# Patient Record
Sex: MALE | Race: WHITE
[De-identification: names, ages, dates, MRNs, and addresses within clinical notes are randomized per-mention and may not be internally consistent; named-entity substitution may affect disease eponyms.]

---

## 2019-09-24 ENCOUNTER — HOSPITAL ENCOUNTER (EMERGENCY)
Dept: HOSPITAL 95 - ER | Age: 59
Discharge: ANOTHER HOSPITAL | End: 2019-09-24
Payer: COMMERCIAL

## 2019-09-24 VITALS — WEIGHT: 167 LBS | BODY MASS INDEX: 25.31 KG/M2 | HEIGHT: 68 IN

## 2019-09-24 DIAGNOSIS — S00.12XA: ICD-10-CM

## 2019-09-24 DIAGNOSIS — S36.039A: ICD-10-CM
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
## 2019-09-24 DIAGNOSIS — S37.031A: ICD-10-CM

## 2019-09-24 DIAGNOSIS — S02.40FA: ICD-10-CM

## 2019-09-24 DIAGNOSIS — S02.82XA: Primary | ICD-10-CM

## 2019-09-24 DIAGNOSIS — Z79.899: ICD-10-CM

## 2019-09-24 LAB
ALBUMIN SERPL BCP-MCNC: 3.4 G/DL (ref 3.4–5)
ALBUMIN/GLOB SERPL: 1.2 {RATIO} (ref 0.8–1.8)
ALT SERPL W P-5'-P-CCNC: 33 U/L (ref 12–78)
ANION GAP SERPL CALCULATED.4IONS-SCNC: 5 MMOL/L (ref 6–16)
AST SERPL W P-5'-P-CCNC: 40 U/L (ref 12–37)
BASOPHILS # BLD AUTO: 0.05 K/MM3 (ref 0–0.23)
BASOPHILS NFR BLD AUTO: 0 % (ref 0–2)
BILIRUB SERPL-MCNC: 0.9 MG/DL (ref 0.1–1)
BUN SERPL-MCNC: 13 MG/DL (ref 8–24)
CALCIUM SERPL-MCNC: 8.7 MG/DL (ref 8.5–10.1)
CHLORIDE SERPL-SCNC: 109 MMOL/L (ref 98–108)
CO2 SERPL-SCNC: 30 MMOL/L (ref 21–32)
CREAT SERPL-MCNC: 0.9 MG/DL (ref 0.6–1.2)
DEPRECATED RDW RBC AUTO: 44.6 FL (ref 35.1–46.3)
EOSINOPHIL # BLD AUTO: 0.07 K/MM3 (ref 0–0.68)
EOSINOPHIL NFR BLD AUTO: 1 % (ref 0–6)
ERYTHROCYTE [DISTWIDTH] IN BLOOD BY AUTOMATED COUNT: 12.4 % (ref 11.7–14.2)
GLOBULIN SER CALC-MCNC: 2.8 G/DL (ref 2.2–4)
GLUCOSE SERPL-MCNC: 126 MG/DL (ref 70–99)
HCT VFR BLD AUTO: 39.3 % (ref 37–53)
HGB BLD-MCNC: 12.8 G/DL (ref 13.5–17.5)
IMM GRANULOCYTES # BLD AUTO: 0.14 K/MM3 (ref 0–0.1)
IMM GRANULOCYTES NFR BLD AUTO: 1 % (ref 0–1)
LYMPHOCYTES # BLD AUTO: 1.53 K/MM3 (ref 0.84–5.2)
LYMPHOCYTES NFR BLD AUTO: 12 % (ref 21–46)
MCHC RBC AUTO-ENTMCNC: 32.6 G/DL (ref 31.5–36.5)
MCV RBC AUTO: 98 FL (ref 80–100)
MONOCYTES # BLD AUTO: 0.89 K/MM3 (ref 0.16–1.47)
MONOCYTES NFR BLD AUTO: 7 % (ref 4–13)
NEUTROPHILS # BLD AUTO: 10.64 K/MM3 (ref 1.96–9.15)
NEUTROPHILS NFR BLD AUTO: 80 % (ref 41–73)
NRBC # BLD AUTO: 0 K/MM3 (ref 0–0.02)
NRBC BLD AUTO-RTO: 0 /100 WBC (ref 0–0.2)
PLATELET # BLD AUTO: 233 K/MM3 (ref 150–400)
POTASSIUM SERPL-SCNC: 4 MMOL/L (ref 3.5–5.5)
PROT SERPL-MCNC: 6.2 G/DL (ref 6.4–8.2)
SODIUM SERPL-SCNC: 144 MMOL/L (ref 136–145)

## 2019-09-24 PROCEDURE — P9016 RBC LEUKOCYTES REDUCED: HCPCS

## 2021-03-30 ENCOUNTER — HOSPITAL ENCOUNTER (OUTPATIENT)
Dept: HOSPITAL 95 - ER | Age: 61
Setting detail: OBSERVATION
LOS: 1 days | Discharge: HOME | End: 2021-03-31
Attending: HOSPITALIST | Admitting: HOSPITALIST
Payer: COMMERCIAL

## 2021-03-30 VITALS — WEIGHT: 194.67 LBS | HEIGHT: 67.99 IN | BODY MASS INDEX: 29.5 KG/M2

## 2021-03-30 DIAGNOSIS — I25.110: ICD-10-CM

## 2021-03-30 DIAGNOSIS — E78.5: ICD-10-CM

## 2021-03-30 DIAGNOSIS — Z79.82: ICD-10-CM

## 2021-03-30 DIAGNOSIS — E66.9: ICD-10-CM

## 2021-03-30 DIAGNOSIS — I24.9: Primary | ICD-10-CM

## 2021-03-30 DIAGNOSIS — D72.829: ICD-10-CM

## 2021-03-30 DIAGNOSIS — Z95.5: ICD-10-CM

## 2021-03-30 DIAGNOSIS — I25.2: ICD-10-CM

## 2021-03-30 DIAGNOSIS — Z20.822: ICD-10-CM

## 2021-03-30 DIAGNOSIS — I10: ICD-10-CM

## 2021-03-30 DIAGNOSIS — K21.9: ICD-10-CM

## 2021-03-30 DIAGNOSIS — R31.9: ICD-10-CM

## 2021-03-30 DIAGNOSIS — N40.0: ICD-10-CM

## 2021-03-30 LAB
ALBUMIN SERPL BCP-MCNC: 4 G/DL (ref 3.4–5)
ALBUMIN/GLOB SERPL: 1 {RATIO} (ref 0.8–1.8)
ALT SERPL W P-5'-P-CCNC: 34 U/L (ref 12–78)
ANION GAP SERPL CALCULATED.4IONS-SCNC: 7 MMOL/L (ref 6–16)
AST SERPL W P-5'-P-CCNC: 29 U/L (ref 12–37)
BASOPHILS # BLD AUTO: 0.1 K/MM3 (ref 0–0.23)
BASOPHILS NFR BLD AUTO: 1 % (ref 0–2)
BILIRUB SERPL-MCNC: 0.9 MG/DL (ref 0.1–1)
BUN SERPL-MCNC: 18 MG/DL (ref 8–24)
CALCIUM SERPL-MCNC: 9.1 MG/DL (ref 8.5–10.1)
CHLORIDE SERPL-SCNC: 104 MMOL/L (ref 98–108)
CO2 SERPL-SCNC: 27 MMOL/L (ref 21–32)
CREAT SERPL-MCNC: 0.85 MG/DL (ref 0.6–1.2)
DEPRECATED RDW RBC AUTO: 48.6 FL (ref 35.1–46.3)
EOSINOPHIL # BLD AUTO: 0.27 K/MM3 (ref 0–0.68)
EOSINOPHIL NFR BLD AUTO: 2 % (ref 0–6)
ERYTHROCYTE [DISTWIDTH] IN BLOOD BY AUTOMATED COUNT: 13.4 % (ref 11.7–14.2)
FLUAV RNA SPEC QL NAA+PROBE: NEGATIVE
FLUBV RNA SPEC QL NAA+PROBE: NEGATIVE
GLOBULIN SER CALC-MCNC: 3.9 G/DL (ref 2.2–4)
GLUCOSE SERPL-MCNC: 75 MG/DL (ref 70–99)
HCT VFR BLD AUTO: 46.9 % (ref 37–53)
HGB BLD-MCNC: 15.6 G/DL (ref 13.5–17.5)
IMM GRANULOCYTES # BLD AUTO: 0.03 K/MM3 (ref 0–0.1)
IMM GRANULOCYTES NFR BLD AUTO: 0 % (ref 0–1)
LYMPHOCYTES # BLD AUTO: 3.37 K/MM3 (ref 0.84–5.2)
LYMPHOCYTES NFR BLD AUTO: 22 % (ref 21–46)
MCHC RBC AUTO-ENTMCNC: 33.3 G/DL (ref 31.5–36.5)
MCV RBC AUTO: 98 FL (ref 80–100)
MONOCYTES # BLD AUTO: 1.92 K/MM3 (ref 0.16–1.47)
MONOCYTES NFR BLD AUTO: 13 % (ref 4–13)
NEUTROPHILS # BLD AUTO: 9.43 K/MM3 (ref 1.96–9.15)
NEUTROPHILS NFR BLD AUTO: 62 % (ref 41–73)
NRBC # BLD AUTO: 0 K/MM3 (ref 0–0.02)
NRBC BLD AUTO-RTO: 0 /100 WBC (ref 0–0.2)
PLATELET # BLD AUTO: 340 K/MM3 (ref 150–400)
POTASSIUM SERPL-SCNC: 4 MMOL/L (ref 3.5–5.5)
PROT SERPL-MCNC: 7.9 G/DL (ref 6.4–8.2)
PROTHROMBIN TIME: 10.5 SEC (ref 9.7–11.5)
RSV RNA SPEC QL NAA+PROBE: NEGATIVE
SARS-COV-2 RNA RESP QL NAA+PROBE: NEGATIVE
SODIUM SERPL-SCNC: 138 MMOL/L (ref 136–145)
TROPONIN I SERPL-MCNC: <0.015 NG/ML (ref 0–0.04)

## 2021-03-30 PROCEDURE — C1874 STENT, COATED/COV W/DEL SYS: HCPCS

## 2021-03-30 PROCEDURE — C1894 INTRO/SHEATH, NON-LASER: HCPCS

## 2021-03-30 PROCEDURE — A9270 NON-COVERED ITEM OR SERVICE: HCPCS

## 2021-03-30 PROCEDURE — C1887 CATHETER, GUIDING: HCPCS

## 2021-03-30 PROCEDURE — C9113 INJ PANTOPRAZOLE SODIUM, VIA: HCPCS

## 2021-03-30 PROCEDURE — G0378 HOSPITAL OBSERVATION PER HR: HCPCS

## 2021-03-30 PROCEDURE — C1725 CATH, TRANSLUMIN NON-LASER: HCPCS

## 2021-03-30 PROCEDURE — C1769 GUIDE WIRE: HCPCS

## 2021-03-30 PROCEDURE — C9600 PERC DRUG-EL COR STENT SING: HCPCS

## 2021-03-30 NOTE — NUR
RECEIVED REPORT
RECEIVED REPORT FROM JADEN GARAY ON MEDICAL FLOOR. PT STILL IN CATH LAB AT THIS
TIME.

## 2021-03-30 NOTE — NUR
RECIEVED REPORT FROM ELISA BARNES RN, @ [id number]. IT WAS DETERMINED THAT PATIENT
WOULD NEED TO BE IN ANOTHER ROOM DUE TO BAD TELE-RECEPTION. PATIENT TO GO TO
ROOM 307 INSTEAD . I VERBALIZED REPORT TO HANK STANLEY @ [id number].

## 2021-03-30 NOTE — NUR
SHIFT SUMMARY
PT ARRIVED TO UNIT FROM  AT 1515. HEPARIN GTT DC'D PER PHYSICIAN. PT REPOTS
CONTINUED CHEST PAIN RATING AT 6/10. PHYSICIAN NOTIFIED. MEDICATION AND EKG
ORDERED PER PHYSICIAN AND PROVIDED. HR STABLE. BP STABLE. OXYGEN SATURATION
MAINTAIEND ABOVE 92% ON RA. R RADIAL SITE WNL. 10 ML OF AIR REMOVED, TR BAND
STILL IN PLACE. POST OP VITAL SIGNS REMAIN STABLE. WILL CONTINUE TO MONITOR
UNTIL REPORT GIVEN TO NIGHTSHIFT RN.

## 2021-03-30 NOTE — NUR
PHYSICIAN NOTIFIED
PHYSICIAN NOTIFEID OF PT'S CONTINUED CP AT 6/10. EKG AND MEDICATION ORDERED
PER PHYSICIAN.

## 2021-03-31 LAB
ANION GAP SERPL CALCULATED.4IONS-SCNC: 7 MMOL/L (ref 6–16)
BUN SERPL-MCNC: 15 MG/DL (ref 8–24)
CALCIUM SERPL-MCNC: 8.2 MG/DL (ref 8.5–10.1)
CHLORIDE SERPL-SCNC: 107 MMOL/L (ref 98–108)
CO2 SERPL-SCNC: 26 MMOL/L (ref 21–32)
CREAT SERPL-MCNC: 0.68 MG/DL (ref 0.6–1.2)
DEPRECATED RDW RBC AUTO: 49.3 FL (ref 35.1–46.3)
ERYTHROCYTE [DISTWIDTH] IN BLOOD BY AUTOMATED COUNT: 13.8 % (ref 11.7–14.2)
GLUCOSE SERPL-MCNC: 114 MG/DL (ref 70–99)
HCT VFR BLD AUTO: 37.7 % (ref 37–53)
HGB BLD-MCNC: 12.5 G/DL (ref 13.5–17.5)
KETONES UR STRIP-MCNC: (no result) MG/DL
LEUKOCYTE ESTERASE UR QL STRIP: (no result)
MCHC RBC AUTO-ENTMCNC: 33.2 G/DL (ref 31.5–36.5)
MCV RBC AUTO: 97 FL (ref 80–100)
NRBC # BLD AUTO: 0 K/MM3 (ref 0–0.02)
NRBC BLD AUTO-RTO: 0 /100 WBC (ref 0–0.2)
OPIATES UR-MCNC: DETECTED NG/ML
PLATELET # BLD AUTO: 286 K/MM3 (ref 150–400)
POTASSIUM SERPL-SCNC: 3.9 MMOL/L (ref 3.5–5.5)
PROT UR STRIP-MCNC: (no result) MG/DL
SODIUM SERPL-SCNC: 140 MMOL/L (ref 136–145)
SP GR SPEC: 1.02 (ref 1–1.02)
UROBILINOGEN UR STRIP-MCNC: (no result) MG/DL
WBC #/AREA URNS HPF: (no result) /HPF (ref 0–5)

## 2021-03-31 NOTE — NUR
PT DISCHARGED TO HOME WITH DISCHARGE ORDERS, PT TO GO STRAIGHT TO [place]
WITH HIS MOM AND WILL PROVIDE TRANSPORTATION. RIGHT RADIAL SITE WITH
TRANSPARENT DRESSING REMAINED INTACT, PT TO FOLLOW UP WITH DR NELSON ON
4/23/21 AT 10A, PT INSTRUCTED WITH NEW PRESCRIPTIONS AND WAS SENT TO [facility] IN [place], RIGHT RADIAL SITE CARE. PT DENIES ANY CHEST PAIN THIS AM,
VITALS HAS BEEN STABLE AND PT HAS BEEN INDEPENDENT IN THE ROOM. PT OUT OF THE
FACILITY AT 12:15P

## 2021-03-31 NOTE — NUR
SHIFT SUMMARY
PT A&O X 4; MAKES NEEDS KNOWN; STATES CHEST PAIN RESOLVED; VSS; NSR NOTED ON
TELE W/ HR 70-80'S; TR BAND FULLY RECOVERED APPROXIMATLEY 2200; TEGADERM AND
ARM BOARD IN PLACE; O2 SATS >93 ON SECOND DIGIT R HAND; RA OR HOME CPAP @ NOC;
RT SET UP HOME CPAP; NO DISTRESS NOTED; SLEPT SEVERAL HOURS; CALL LIGHT IN
REACH; BED IN LOWEST POSITION; REPORT GIVEN TO JADEN LARRY.